# Patient Record
Sex: MALE | Race: BLACK OR AFRICAN AMERICAN
[De-identification: names, ages, dates, MRNs, and addresses within clinical notes are randomized per-mention and may not be internally consistent; named-entity substitution may affect disease eponyms.]

---

## 2018-10-10 ENCOUNTER — HOSPITAL ENCOUNTER (EMERGENCY)
Dept: HOSPITAL 72 - EMR | Age: 58
Discharge: HOME | End: 2018-10-10
Payer: MEDICAID

## 2018-10-10 VITALS — BODY MASS INDEX: 33.62 KG/M2 | HEIGHT: 74 IN | WEIGHT: 262 LBS

## 2018-10-10 VITALS — DIASTOLIC BLOOD PRESSURE: 86 MMHG | SYSTOLIC BLOOD PRESSURE: 135 MMHG

## 2018-10-10 DIAGNOSIS — G51.0: Primary | ICD-10-CM

## 2018-10-10 PROCEDURE — 70450 CT HEAD/BRAIN W/O DYE: CPT

## 2018-10-10 PROCEDURE — 99284 EMERGENCY DEPT VISIT MOD MDM: CPT

## 2018-10-10 NOTE — DIAGNOSTIC IMAGING REPORT
Indication: Left facial paralysis for last 3 days

 

Technique: Contiguous 5 mm thick transaxial imaging of the head obtained in a Siemens

Sensation 64 slice CT scanner.  Soft tissue and bone windows generated.  Automatic

Exposure Control was utilized.

 

 

Total Dose length Product (DLP):  1425 mGycm

 

CT Dose Index Volume (CTDIvol):   0.15, 70.38 mGy

 

Comparison: none

 

Findings: The size and configuration of the cortical sulci, basal cisterns, and

ventricles are within normal limits for age. There is no mass effect, midline shift,

or edema identified. There is no evidence of acute hemorrhage or abnormal intra-axial

or extra-axial fluid collections. The bones and soft tissues are unremarkable.

 

Impression: No mass effect, edema or acute bleed.

 

 

 

The CT scanner at [facility] is accredited by the [employer]

Rad[employer] and the scans are performed using dose optimization techniques as

appropriate to a performed exam including Automatic Exposure control.

## 2018-10-11 NOTE — EMERGENCY ROOM REPORT
History of Present Illness


General


Chief Complaint:  General Complaint


Source:  Patient





Present Illness


HPI


58-year-old male presents ED complaining of facial droop 3 days.  Denies pain.

  Denies slurred speech.  Denies arm or leg weakness.  Denies headache.  Denies 

blurry vision.  Denies difficulty swallowing.  No other aggravating relieving 

factors.  Denies any other associated symptoms


Allergies:  


Coded Allergies:  


     No Known Allergies (Unverified , 10/10/18)





Patient History


Past Medical History:  none


Past Surgical History:  none


Pertinent Family History:  none


Social History:  Denies: smoking, alcohol use, drug use


Immunizations:  UTD


Reviewed Nursing Documentation:  PMH: Agreed; PSxH: Agreed





Nursing Documentation-PMH


Past Medical History:  No Stated History





Review of Systems


All Other Systems:  negative except mentioned in HPI





Physical Exam





Vital Signs








  Date Time  Temp Pulse Resp B/P (MAP) Pulse Ox O2 Delivery O2 Flow Rate FiO2


 


10/10/18 13:17 98.2 103 18 138/86 95 Room Air  





 98.2       








Sp02 EP Interpretation:  reviewed, normal


General Appearance:  no apparent distress, alert, GCS 15, non-toxic


Head:  normocephalic


Eyes:  bilateral eye normal inspection, bilateral eye PERRL, bilateral eye EOMI

, bilateral eye visual acuity


ENT:  hearing grossly normal, normal pharynx, no angioedema, normal voice


Neck:  full range of motion, supple/symm/no masses


Respiratory:  normal inspection


Cardiovascular #1:  normal inspection


Gastrointestinal:  normal inspection


Rectal:  deferred


Genitourinary:  normal inspection


Musculoskeletal:  normal inspection


Neurologic:  alert, oriented x3, responsive, motor strength/tone normal, 

sensory intact, speech normal, facial droop, other - unable to raise L eyebrow. 

unable to close L eye


Psychiatric:  judgement/insight normal, memory normal, mood/affect normal, no 

suicidal/homicidal ideation


Skin:  normal inspection


Lymphatic:  normal inspection





Medical Decision Making


Diagnostic Impression:  


 Primary Impression:  


 Bell's palsy


ER Course


58-year-old male presents to ED complaining of left-sided facial droop x 3 days 





Differential diagnoses include: migraine, CVA/TIA, bells palsy





Clinical course


Patient placed on stretcher. on cardiac monitor. After initial history, 

physical exam reveals a middle aged male in no acute distress.  There is no 

nuchal rigidity.  Extraocular movements intact.  There is no sensory deficit.  

Some mild facial droop on the left side.  Unable to raise her eyebrow or close 

her left eye on the left.  5 out of 5 motor in both arms and legs.  No sensory 

deficit. no slurred speech 





CT brain - no acute process noted 





Discussed findings with patient and family.  Findings consistent with Bell's 

palsy.  Not likely stroke.  Patient's mother is a nurse and agrees with 

assessment.  Safe for discharge.  We'll prescribe acyclovir, prednisone, 

artificial tears.  Close follow-up with PMD








I.  I feel this is a highly complex case requiring extensive working including 

EKG/Rhythm strip, Xray/CT/US, Blood/urine lab work, repeat exams while in ED, 

and administration of strong opiates/narcotics for pain control, admission to 

hospital or close patient follow up.  





Diagnosis - [place] palsy





Stable and discharged to home with prescription for acyclovir, prednisone, 

artificial tears.  Follow-up with PMD.  Return to ED if symptoms recur or worsen


CT/MRI/US Diagnostic Results


CT/MRI/US Diagnostic Results :  


   Imaging Test Ordered:  CT Head


   Impression


no acute process





Last Vital Signs








  Date Time  Temp Pulse Resp B/P (MAP) Pulse Ox O2 Delivery O2 Flow Rate FiO2


 


10/10/18 14:40 98.2 100 16 135/86 96 Room Air  





 98.2       








Status:  improved


Disposition:  HOME, SELF-CARE


Condition:  Stable


Scripts


Dextran 70/Hypromellose (ARTIFICIAL TEARS EYE DROPS*) 15 Ml Drops


1 DROP LEFT EYE QID, #15 ML 0 Refills


   Prov: Samuel Cutler MD         10/10/18 


Prednisone* (PREDNISONE*) 20 Mg Tablet


60 MG ORAL DAILY, #15 TAB


   Prov: Samuel Cutler MD         10/10/18 


Acyclovir* (ZOVIRAX*) 800 Mg Tablet


800 MG ORAL FIVE TIMES A DAY for 7 Days, TAB


   Prov: Samuel Cutler MD         10/10/18


Referrals:  


[facility],REFERRING (PCP)


Patient Instructions:  [place] Palsy











Samuel Cutler MD Oct 11, 2018 07:20